# Patient Record
Sex: FEMALE | Employment: FULL TIME | URBAN - METROPOLITAN AREA
[De-identification: names, ages, dates, MRNs, and addresses within clinical notes are randomized per-mention and may not be internally consistent; named-entity substitution may affect disease eponyms.]

---

## 2022-09-26 ENCOUNTER — OFFICE VISIT (OUTPATIENT)
Dept: INFECTIOUS DISEASES | Age: 47
End: 2022-09-26
Payer: COMMERCIAL

## 2022-09-26 VITALS
DIASTOLIC BLOOD PRESSURE: 85 MMHG | BODY MASS INDEX: 22.53 KG/M2 | RESPIRATION RATE: 16 BRPM | SYSTOLIC BLOOD PRESSURE: 142 MMHG | HEART RATE: 96 BPM | HEIGHT: 64 IN | WEIGHT: 132 LBS

## 2022-09-26 DIAGNOSIS — R89.4 POSITIVE TEST FOR HERPES SIMPLEX VIRUS ANTIBODY: Primary | ICD-10-CM

## 2022-09-26 PROCEDURE — 99204 OFFICE O/P NEW MOD 45 MIN: CPT | Performed by: INTERNAL MEDICINE

## 2022-09-26 RX ORDER — ACYCLOVIR 400 MG/1
400 TABLET ORAL DAILY
Qty: 30 TABLET | Refills: 12 | Status: SHIPPED | OUTPATIENT
Start: 2022-09-26 | End: 2022-10-26

## 2022-09-26 ASSESSMENT — ENCOUNTER SYMPTOMS
ABDOMINAL DISTENTION: 0
COLOR CHANGE: 0
EYE DISCHARGE: 0
APNEA: 0

## 2022-09-26 NOTE — PROGRESS NOTES
[facility] - Initial Consult Note  Today's Date: 9/26/2022    Impression :   Herpes 2 + test while  is neg after many years of marriage, and she is herpes 1 neg, he is HSV1   Both never had a herpes lesion   concerned about catching it  - putting stress on the family   Possibly false + or just low shedding leading to no spreading  Fibroma w dyspareunia - planned for hysterectomy soon    Recommendations     Best is to take acyclovir 4000 daily long term   Diagnosis Orders   1. Positive test for herpes simplex virus antibody  acyclovir (ZOVIRAX) 400 MG tablet          Return in about 1 year (around 9/26/2023). History of Present Illness:   Poli Schaffer is a 51y.o.-year-old  female who presents with   Chief Complaint   Patient presents with    Frequent Infections     New Pt  Herpesviral infection, unspecified     New pt -Visit 9/26/22  Herpes 2 + test while  is neg after many years of marriage, and she is herpes 1 neg, he is HSV1   Both never had a herpes lesion   concerned about catching it  - putting stress on the family   Possibly false + or just low shedding leading to no spreading  Fibroma w dyspareunia - planned for hysterectomy soon    Long disc in office about the un reliable status of the current HSV tests- hsv 1 IGG can be false neg and the type 2 can be false +,  Hence and since they never had lesions, they are either low shedders or not infected   She is getting very stressed due to this  Has a fibroma leading to some dyspareunia -       Exam neg  Best is to take acyclovir 4000 daily long term    I have personally reviewed the past medical history, past surgical history, medications, social history, and family history, and I haveupdated the database accordingly. Past Medical History:   History reviewed. No pertinent past medical history. Past Surgical  History:   No past surgical history on file.     Medications:     Current Outpatient Medications:     acyclovir (ZOVIRAX) 400 MG tablet, Take 1 tablet by mouth daily, Disp: 30 tablet, Rfl: 12      Social History:     Social History     Socioeconomic History    Marital status: Unknown     Spouse name: Not on file    Number of children: Not on file    Years of education: Not on file    Highest education level: Not on file   Occupational History    Not on file   Tobacco Use    Smoking status: Never    Smokeless tobacco: Not on file   Substance and Sexual Activity    Alcohol use: Not on file    Drug use: Not on file    Sexual activity: Not on file   Other Topics Concern    Not on file   Social History Narrative    Not on file     Social Determinants of Health     Financial Resource Strain: Not on file   Food Insecurity: Not on file   Transportation Needs: Not on file   Physical Activity: Not on file   Stress: Not on file   Social Connections: Not on file   Intimate Partner Violence: Not on file   Housing Stability: Not on file       Family History:   No family history on file. Allergies:   Patient has no known allergies. Review of Systems:   Review of Systems   Constitutional:  Negative for activity change. HENT:  Negative for congestion. Eyes:  Negative for discharge. Respiratory:  Negative for apnea. Cardiovascular:  Negative for chest pain. Gastrointestinal:  Negative for abdominal distention. Endocrine: Negative for cold intolerance. Genitourinary:  Negative for dysuria. Musculoskeletal:  Negative for arthralgias. Skin:  Negative for color change. Allergic/Immunologic: Negative for immunocompromised state. Neurological:  Negative for dizziness. Hematological:  Negative for adenopathy. Psychiatric/Behavioral:  Negative for agitation. Physical Examination :   Blood pressure (!) 142/85, pulse 96, resp. rate 16, height 5' 4\" (1.626 m), weight 132 lb (59.9 kg). Physical Exam  Constitutional:       General: She is not in acute distress. Appearance: Normal appearance. She is not ill-appearing. HENT:      Head: Normocephalic and atraumatic. Nose: Nose normal.      Mouth/Throat:      Mouth: Mucous membranes are moist.   Eyes:      General: No scleral icterus. Conjunctiva/sclera: Conjunctivae normal.   Cardiovascular:      Rate and Rhythm: Normal rate and regular rhythm. Heart sounds: Normal heart sounds. No murmur heard. Pulmonary:      Breath sounds: No wheezing. Abdominal:      General: There is no distension. Tenderness: There is no abdominal tenderness. Genitourinary:     Comments: No junior  Musculoskeletal:         General: No swelling or deformity. Cervical back: Neck supple. No rigidity. Skin:     General: Skin is dry. Coloration: Skin is not jaundiced. Neurological:      General: No focal deficit present. Mental Status: She is alert and oriented to person, place, and time. Psychiatric:         Mood and Affect: Mood normal.         Thought Content: Thought content normal.         Medical Decision Making:   I have independently reviewed/ordered the following labs:    CBCwith Differential: No results found for: WBC, HGB, HCT, PLT, SEGSPCT, BANDSPCT, LYMPHOPCT, MONOPCT, EOSPCT  BMP:No results found for: NA, K, CL, CO2, BUN, CREATININE, CA, MG  Hepatic Function Panel: No results found for: PROT, LABALBU, BILIDIR, IBILI, BILITOT, ALKPHOS, ALT, AST  No results found for: RPR  No results found for: HIV  No results found for: BC  No results found for: EPICELLS, MUCUS, PH, RBC, TRICHOMONAS, WBC, YEAST, TURBIDITY  No results found for: CREATININE, GLUCOSE, SODIUM, Eleonoar Susan for allowing us to participate in the care of this patient. Please call with questions. Marcia Dominguez MD  - Office: [phone]    Please note that this chart was generated using voice recognition Dragon dictation software.   Although every effort was made to ensure the accuracy of this automated transcription, some errors in transcription mayhave occurred.